# Patient Record
(demographics unavailable — no encounter records)

---

## 2025-01-13 NOTE — PHYSICAL EXAM
[Well-appearing] : well-appearing [Normocephalic] : normocephalic [No dysmorphic facial features] : no dysmorphic facial features [Alert] : alert [Well related, good eye contact] : well related, good eye contact [Conversant] : conversant [Normal speech and language] : normal speech and language [Follows instructions well] : follows instructions well [VFF] : VFF [Pupils reactive to light and accommodation] : pupils reactive to light and accommodation [Full extraocular movements] : full extraocular movements [Saccadic and smooth pursuits intact] : saccadic and smooth pursuits intact [No nystagmus] : no nystagmus [No papilledema] : no papilledema [Normal facial sensation to light touch] : normal facial sensation to light touch [No facial asymmetry or weakness] : no facial asymmetry or weakness [Gross hearing intact] : gross hearing intact [Equal palate elevation] : equal palate elevation [Normal tongue movement] : normal tongue movement [Normal axial and appendicular muscle tone] : normal axial and appendicular muscle tone [Gets up on table without difficulty] : gets up on table without difficulty [No pronator drift] : no pronator drift [Normal finger tapping and fine finger movements] : normal finger tapping and fine finger movements [No abnormal involuntary movements] : no abnormal involuntary movements [5/5 strength in proximal and distal muscles of arms and legs] : 5/5 strength in proximal and distal muscles of arms and legs [Walks and runs well] : walks and runs well [Able to walk on heels] : able to walk on heels [Able to walk on toes] : able to walk on toes [Localizes LT and temperature] : localizes LT and temperature [No dysmetria on FTNT] : no dysmetria on FTNT [Good walking balance] : good walking balance [Normal gait] : normal gait [Able to tandem well] : able to tandem well [Negative Romberg] : negative Romberg

## 2025-01-13 NOTE — REASON FOR VISIT
[Initial Consultation] : an initial consultation for [Seizure] : seizure [Mother] : mother [Interpreters_IDNumber] : [id number] [TWNoteComboBox1] : [place]

## 2025-01-13 NOTE — HISTORY OF PRESENT ILLNESS
[Infections] : infections [Previous Imaging] : yes [FreeTextEntry1] : patient had a seizure on 12/29/24 mom reports that patient had a seizure 6 years ago when he was in [place] but none since then until 12/29 they were at the store and he fell forward, salivated on his mouth, mom spoke to him and he reacted, lasted maybe 4 seconds went to the hospital, was already back to baseline had labs done, CT head normal patient was positive for flu on 12/25 (fever and flu), was still sick at the time when he had a seizure he has been feeling fine since the hospital, continues to have a cough but better than before  CT head normal  he has never been on an anti epileptic medication patient has an uncle who has seizures  patient was born full term patient started walking and talking on time, no issues with development no history of meningitis or encephalitis [Head Trauma] : no head trauma [Stressors] : no stressors [de-identified] : flu on 12/25 [de-identified] : CT head negative

## 2025-01-13 NOTE — ASSESSMENT
[FreeTextEntry1] : 10yo male with no significant pmh who is here for initial evaluation of seizure which occurred on 12/29, in setting of flu illness. Seizure lasted about 4 seconds, did not require abortive treatment and patient was back to baseline quickly. This is second lifetime seizure for patient, last one was 6 years ago. Since this was a provoked seizure, will do work up with routine EEG and brain MRI. If any abnormalities found, will start AED treatment. Given rescue med to take home.

## 2025-01-13 NOTE — PLAN
[FreeTextEntry1] : Brain MRI without contrast routine EEG given valtoco 7.5mg x 2, one in each nostril for seizure greater than 3 min to let me know if patient has any further episodes